# Patient Record
Sex: FEMALE | Race: WHITE | NOT HISPANIC OR LATINO
[De-identification: names, ages, dates, MRNs, and addresses within clinical notes are randomized per-mention and may not be internally consistent; named-entity substitution may affect disease eponyms.]

---

## 2017-10-03 ENCOUNTER — APPOINTMENT (OUTPATIENT)
Dept: OBGYN | Facility: CLINIC | Age: 52
End: 2017-10-03
Payer: COMMERCIAL

## 2017-10-03 VITALS
DIASTOLIC BLOOD PRESSURE: 81 MMHG | HEART RATE: 70 BPM | HEIGHT: 68 IN | BODY MASS INDEX: 21.67 KG/M2 | WEIGHT: 143 LBS | SYSTOLIC BLOOD PRESSURE: 136 MMHG

## 2017-10-03 DIAGNOSIS — Z12.11 ENCOUNTER FOR SCREENING FOR MALIGNANT NEOPLASM OF COLON: ICD-10-CM

## 2017-10-03 PROCEDURE — 99396 PREV VISIT EST AGE 40-64: CPT

## 2017-10-04 LAB — HPV HIGH+LOW RISK DNA PNL CVX: NOT DETECTED

## 2017-10-08 LAB — CYTOLOGY CVX/VAG DOC THIN PREP: NORMAL

## 2021-01-04 ENCOUNTER — APPOINTMENT (OUTPATIENT)
Age: 56
End: 2021-01-04
Payer: MEDICAID

## 2021-01-04 VITALS
SYSTOLIC BLOOD PRESSURE: 131 MMHG | DIASTOLIC BLOOD PRESSURE: 82 MMHG | WEIGHT: 156 LBS | HEIGHT: 68 IN | BODY MASS INDEX: 23.64 KG/M2 | HEART RATE: 70 BPM

## 2021-01-04 DIAGNOSIS — Z12.4 ENCOUNTER FOR SCREENING FOR MALIGNANT NEOPLASM OF CERVIX: ICD-10-CM

## 2021-01-04 PROCEDURE — 99072 ADDL SUPL MATRL&STAF TM PHE: CPT

## 2021-01-04 PROCEDURE — 99386 PREV VISIT NEW AGE 40-64: CPT

## 2021-01-05 LAB — HPV HIGH+LOW RISK DNA PNL CVX: NOT DETECTED

## 2021-01-15 LAB — CYTOLOGY CVX/VAG DOC THIN PREP: NORMAL

## 2021-03-10 ENCOUNTER — RESULT REVIEW (OUTPATIENT)
Age: 56
End: 2021-03-10

## 2021-03-10 ENCOUNTER — OUTPATIENT (OUTPATIENT)
Dept: OUTPATIENT SERVICES | Facility: HOSPITAL | Age: 56
LOS: 1 days | End: 2021-03-10
Payer: COMMERCIAL

## 2021-03-10 ENCOUNTER — APPOINTMENT (OUTPATIENT)
Dept: MAMMOGRAPHY | Facility: IMAGING CENTER | Age: 56
End: 2021-03-10
Payer: COMMERCIAL

## 2021-03-10 ENCOUNTER — APPOINTMENT (OUTPATIENT)
Dept: ULTRASOUND IMAGING | Facility: IMAGING CENTER | Age: 56
End: 2021-03-10
Payer: COMMERCIAL

## 2021-03-10 DIAGNOSIS — Z12.39 ENCOUNTER FOR OTHER SCREENING FOR MALIGNANT NEOPLASM OF BREAST: ICD-10-CM

## 2021-03-10 DIAGNOSIS — R92.2 INCONCLUSIVE MAMMOGRAM: ICD-10-CM

## 2021-03-10 PROCEDURE — 77063 BREAST TOMOSYNTHESIS BI: CPT | Mod: 26

## 2021-03-10 PROCEDURE — 77067 SCR MAMMO BI INCL CAD: CPT | Mod: 26

## 2021-03-10 PROCEDURE — 77067 SCR MAMMO BI INCL CAD: CPT

## 2021-03-10 PROCEDURE — 76641 ULTRASOUND BREAST COMPLETE: CPT

## 2021-03-10 PROCEDURE — 77063 BREAST TOMOSYNTHESIS BI: CPT

## 2021-03-10 PROCEDURE — 76641 ULTRASOUND BREAST COMPLETE: CPT | Mod: 26,50

## 2022-11-05 ENCOUNTER — APPOINTMENT (OUTPATIENT)
Dept: ORTHOPEDIC SURGERY | Facility: CLINIC | Age: 57
End: 2022-11-05

## 2022-11-05 NOTE — HISTORY OF PRESENT ILLNESS
[de-identified] : Ms. KALIN PAULINO is a 56 year female who presents to office complaining of right leg pain.\par \par All review of systems, family history, social history, surgical history, past medical history, medications, and allergies not previously stated as positive are negative. They were reviewed by me today with the patient and documented accordingly.

## 2022-11-05 NOTE — DISCUSSION/SUMMARY
[de-identified] : Diagnosis\par Medication Rx?\par Physical Therapy ordered?\par MRI ordered?\par Brace/splint/cast/crutches given?\par Cortisone injection or aspiration done?\par Pain management referral?\par Follow up with  ?\par All options discussed with patient.\par All questions by patient answered to their satisfaction.\par Patient expresses full understanding and agreement with plan.\par Patient is happy with the office visit.

## 2022-11-05 NOTE — PHYSICAL EXAM
[de-identified] : Right Hip/Pelvis: No obvious deformities, atrophy, ecchymosis, or swelling/effusion. Equivalent leg lengths noted. Negative tenderness to sacroiliac joint, greater/lesser trochanters, proximal flexor tendon, and groin. Normal active and passive range of motion, including flexion to 125 degrees, extension to 115 degrees, internal/external rotation to 45 degrees, and abduction/adduction to 45 degrees. Negative BRIANNA, FADIR, and Impingement tests. Neurovascular status is intact.\par \par Right Knee: No obvious deformities, atrophy, ecchymosis, or swelling/effusion. Negative tenderness to medial/lateral joint lines, patella, popliteal fossa, distal quadriceps. patellar tendon, or tibial tubercle. Normal active and passive range of motion, including flexion to 130 degrees and extension to 0 degrees. Negative Anterior/Posterior Drawer, Lachman's, Eleuterio's, Pivot Shift, and Valgus/Varus Stress tests. Neurovascular status is intact.\par \par Right Ankle/Foot: No obvious deformities, atrophy, ecchymosis, or swelling/effusion. Negative tenderness to distal tibia/fibula, medial/lateral malleolus, syndesmotic ligaments, Achilles tendon, anterior talofibular and deltoid ligaments, metatarsals, phalanges, MTP joints, PIP joints, and DIP joints. Normal active and passive range of motion, including dorsiflexion/plantarflexion to 60 degrees and inversion/eversion to 45 degrees. Negative Anterior Drawer, Ramirez's, Syndesmotic Squeeze, and Plantar Fasciitis tests. Neurovascular status is intact.\par \par Otherwise, no apparent distress. Alert and oriented x 3. Normal mood and affect. No atrophy or swelling. 5 out of 5 motor strength. Sensation is intact and symmetrical. Normal deep tendon reflexes. Full range of motion of shoulder, elbows, hips, and knees (flexion, extension, and rotation). No palpatory tenderness or palpable lymph nodes. Negative straight leg raise. Normal finger-to-nose test. No pathological reflexes. Normal ambulation. No upper or lower extremity instability. [de-identified] : # views of ? are negative for any obvious fractures or dislocations. The patient understands that this is a wet read and I am not a radiologist. If the final read reveals something different than discussed in the office, then the patient will get a call back in the next 24 - 48 hours to discuss.

## 2024-02-27 ENCOUNTER — APPOINTMENT (OUTPATIENT)
Dept: OBGYN | Facility: CLINIC | Age: 59
End: 2024-02-27
Payer: COMMERCIAL

## 2024-02-27 VITALS
SYSTOLIC BLOOD PRESSURE: 132 MMHG | DIASTOLIC BLOOD PRESSURE: 80 MMHG | WEIGHT: 148 LBS | BODY MASS INDEX: 22.43 KG/M2 | HEIGHT: 68 IN

## 2024-02-27 DIAGNOSIS — Z01.419 ENCOUNTER FOR GYNECOLOGICAL EXAMINATION (GENERAL) (ROUTINE) W/OUT ABNORMAL FINDINGS: ICD-10-CM

## 2024-02-27 DIAGNOSIS — Z12.39 ENCOUNTER FOR OTHER SCREENING FOR MALIGNANT NEOPLASM OF BREAST: ICD-10-CM

## 2024-02-27 PROCEDURE — 99386 PREV VISIT NEW AGE 40-64: CPT

## 2024-02-27 NOTE — PLAN
[FreeTextEntry1] : 58 year old female presents for an annual  -PAP done -Rx breast mammo/sono -Colonoscpy scheduled -discussed menopausal sx -sees PCP yearly  RTO in 1 year Sparkle Campbell MD

## 2024-02-27 NOTE — END OF VISIT
[FreeTextEntry3] : I, Kenya Yamil, acted as a scribe on behalf of Dr. Sparkle Campbell M.D. on 02/27/2024.   All medical entries made by the scribe were at my, Dr. Sparkle Campbell M.D. direction and personally dictated by me on 02/27/2024. I have reviewed the chart and agree that the record accurately reflects my personal performance of the history, physical exam, assessment and plan. I have also personally directed, reviewed, and agreed with the chart.

## 2024-02-27 NOTE — PHYSICAL EXAM
[Alert] : alert [Appropriately responsive] : appropriately responsive [No Acute Distress] : no acute distress [Soft] : soft [Non-tender] : non-tender [Non-distended] : non-distended [No HSM] : No HSM [No Lesions] : no lesions [No Mass] : no mass [Oriented x3] : oriented x3 [Examination Of The Breasts] : a normal appearance [No Masses] : no breast masses were palpable [Labia Majora] : normal [Labia Minora] : normal [Normal] : normal [Uterine Adnexae] : normal

## 2024-02-27 NOTE — HISTORY OF PRESENT ILLNESS
[FreeTextEntry1] : 58 year old post menopausal female presents to establish care for an annual. Pt is doing well with no complaints. Denies any h/o abnml pap.  No significant PMHx. PSHx: s/p arthroscopy NKDA [ColonoscopyDate] : 2021

## 2024-02-28 LAB — HPV HIGH+LOW RISK DNA PNL CVX: NOT DETECTED

## 2024-03-01 LAB — CYTOLOGY CVX/VAG DOC THIN PREP: ABNORMAL

## 2024-04-02 ENCOUNTER — RESULT REVIEW (OUTPATIENT)
Age: 59
End: 2024-04-02

## 2024-04-02 ENCOUNTER — APPOINTMENT (OUTPATIENT)
Dept: MAMMOGRAPHY | Facility: CLINIC | Age: 59
End: 2024-04-02
Payer: COMMERCIAL

## 2024-04-02 ENCOUNTER — APPOINTMENT (OUTPATIENT)
Dept: ULTRASOUND IMAGING | Facility: CLINIC | Age: 59
End: 2024-04-02
Payer: COMMERCIAL

## 2024-04-02 PROCEDURE — 77063 BREAST TOMOSYNTHESIS BI: CPT

## 2024-04-02 PROCEDURE — 77067 SCR MAMMO BI INCL CAD: CPT

## 2024-04-02 PROCEDURE — 76641 ULTRASOUND BREAST COMPLETE: CPT | Mod: RT

## 2024-04-03 ENCOUNTER — TRANSCRIPTION ENCOUNTER (OUTPATIENT)
Age: 59
End: 2024-04-03

## 2024-04-08 ENCOUNTER — APPOINTMENT (OUTPATIENT)
Dept: ULTRASOUND IMAGING | Facility: IMAGING CENTER | Age: 59
End: 2024-04-08
Payer: COMMERCIAL

## 2024-04-08 ENCOUNTER — OUTPATIENT (OUTPATIENT)
Dept: OUTPATIENT SERVICES | Facility: HOSPITAL | Age: 59
LOS: 1 days | End: 2024-04-08
Payer: COMMERCIAL

## 2024-04-08 ENCOUNTER — RESULT REVIEW (OUTPATIENT)
Age: 59
End: 2024-04-08

## 2024-04-08 DIAGNOSIS — Z00.8 ENCOUNTER FOR OTHER GENERAL EXAMINATION: ICD-10-CM

## 2024-04-08 PROCEDURE — 76642 ULTRASOUND BREAST LIMITED: CPT | Mod: 26,RT

## 2024-04-08 PROCEDURE — 76642 ULTRASOUND BREAST LIMITED: CPT

## 2024-09-09 ENCOUNTER — APPOINTMENT (OUTPATIENT)
Dept: OBGYN | Facility: CLINIC | Age: 59
End: 2024-09-09
Payer: COMMERCIAL

## 2024-09-09 PROCEDURE — 99459 PELVIC EXAMINATION: CPT

## 2024-09-09 PROCEDURE — 99214 OFFICE O/P EST MOD 30 MIN: CPT

## 2024-09-09 PROCEDURE — 96127 BRIEF EMOTIONAL/BEHAV ASSMT: CPT

## 2024-09-16 ENCOUNTER — APPOINTMENT (OUTPATIENT)
Dept: OBGYN | Facility: CLINIC | Age: 59
End: 2024-09-16

## 2024-09-17 ENCOUNTER — APPOINTMENT (OUTPATIENT)
Dept: ULTRASOUND IMAGING | Facility: CLINIC | Age: 59
End: 2024-09-17
Payer: COMMERCIAL

## 2024-09-17 ENCOUNTER — RESULT REVIEW (OUTPATIENT)
Age: 59
End: 2024-09-17

## 2024-09-17 PROCEDURE — 76830 TRANSVAGINAL US NON-OB: CPT

## 2024-09-17 PROCEDURE — 76856 US EXAM PELVIC COMPLETE: CPT

## 2024-11-07 ENCOUNTER — RESULT REVIEW (OUTPATIENT)
Age: 59
End: 2024-11-07

## 2024-11-21 ENCOUNTER — RESULT REVIEW (OUTPATIENT)
Age: 59
End: 2024-11-21